# Patient Record
Sex: FEMALE | Race: WHITE | NOT HISPANIC OR LATINO | Employment: FULL TIME
[De-identification: names, ages, dates, MRNs, and addresses within clinical notes are randomized per-mention and may not be internally consistent; named-entity substitution may affect disease eponyms.]

---

## 2017-06-23 ENCOUNTER — RECORDS - HEALTHEAST (OUTPATIENT)
Dept: ADMINISTRATIVE | Facility: OTHER | Age: 52
End: 2017-06-23

## 2017-08-10 ENCOUNTER — RECORDS - HEALTHEAST (OUTPATIENT)
Dept: ADMINISTRATIVE | Facility: OTHER | Age: 52
End: 2017-08-10

## 2017-10-03 ASSESSMENT — MIFFLIN-ST. JEOR: SCORE: 1490.13

## 2017-10-05 ENCOUNTER — SURGERY - HEALTHEAST (OUTPATIENT)
Dept: SURGERY | Facility: CLINIC | Age: 52
End: 2017-10-05

## 2017-10-05 ENCOUNTER — HOME CARE/HOSPICE - HEALTHEAST (OUTPATIENT)
Dept: HOME HEALTH SERVICES | Facility: HOME HEALTH | Age: 52
End: 2017-10-05

## 2017-10-05 ENCOUNTER — ANESTHESIA - HEALTHEAST (OUTPATIENT)
Dept: SURGERY | Facility: CLINIC | Age: 52
End: 2017-10-05

## 2017-10-05 ASSESSMENT — MIFFLIN-ST. JEOR: SCORE: 1456.38

## 2020-11-09 ENCOUNTER — TRANSFERRED RECORDS (OUTPATIENT)
Dept: HEALTH INFORMATION MANAGEMENT | Facility: CLINIC | Age: 55
End: 2020-11-09

## 2021-02-23 ENCOUNTER — TRANSFERRED RECORDS (OUTPATIENT)
Dept: HEALTH INFORMATION MANAGEMENT | Facility: CLINIC | Age: 56
End: 2021-02-23

## 2021-02-24 ENCOUNTER — TRANSCRIBE ORDERS (OUTPATIENT)
Dept: OTHER | Age: 56
End: 2021-02-24

## 2021-02-24 DIAGNOSIS — R09.81 NASAL CONGESTION: Primary | ICD-10-CM

## 2021-02-25 ENCOUNTER — DOCUMENTATION ONLY (OUTPATIENT)
Dept: CARE COORDINATION | Facility: CLINIC | Age: 56
End: 2021-02-25

## 2021-02-25 NOTE — TELEPHONE ENCOUNTER
FUTURE VISIT INFORMATION      FUTURE VISIT INFORMATION:    Date: 3/12/21    Time: 10 AM    [place] [facility]  REFERRAL INFORMATION:    Referring provider:  Dr. Tanner Davis    Referring providers clinic:  [facility]    Reason for visit/diagnosis: Nasal Congestion    RECORDS REQUESTED FROM:       Clinic name Comments Records Status Imaging Status   [facility] 2/23/21 - ENT OV with Dr. Davis Scanned In    [facility] 2/22/21 - [facility] OV with DARRON Nayak  5/29/20 - UC OV with Dr. House  4/28/20 - ENT OV with Dr. Duke  4/3/20 - ENT OV with Dr. Colunga Care Everywhere    [facility] 11/13/20 - ED OV with Dr. Encinas  11/8/20 - ED OV with Dr. Shoemaker Care Everywhere    [facility] - Imaging 7/15/20 - CT Sinus  4/25/19 - XR Sinus  4/26/18 - US Head Neck  4/6/18 - CT Sinus WO Care Everywhere 2/25 Req - In PACs   [facility] 5/11/20 - ED OV with Dr. Hicks Care Everywhere                  * 2/25/21 11:18 AM Faxed req to [facility] for images to be pushed to [facility]s. - Latonya

## 2021-03-01 ENCOUNTER — TRANSFERRED RECORDS (OUTPATIENT)
Dept: HEALTH INFORMATION MANAGEMENT | Facility: CLINIC | Age: 56
End: 2021-03-01

## 2021-03-12 ENCOUNTER — OFFICE VISIT (OUTPATIENT)
Dept: OTOLARYNGOLOGY | Facility: CLINIC | Age: 56
End: 2021-03-12
Attending: OTOLARYNGOLOGY
Payer: COMMERCIAL

## 2021-03-12 ENCOUNTER — PRE VISIT (OUTPATIENT)
Dept: OTOLARYNGOLOGY | Facility: CLINIC | Age: 56
End: 2021-03-12

## 2021-03-12 VITALS — HEIGHT: 64 IN | BODY MASS INDEX: 32.78 KG/M2 | WEIGHT: 192 LBS

## 2021-03-12 DIAGNOSIS — R09.82 POST-NASAL DRIP: ICD-10-CM

## 2021-03-12 DIAGNOSIS — K21.9 LARYNGOPHARYNGEAL REFLUX: ICD-10-CM

## 2021-03-12 DIAGNOSIS — R09.81 NASAL CONGESTION: ICD-10-CM

## 2021-03-12 DIAGNOSIS — J34.3 HYPERTROPHY OF INFERIOR NASAL TURBINATE: Primary | ICD-10-CM

## 2021-03-12 PROCEDURE — 99243 OFF/OP CNSLTJ NEW/EST LOW 30: CPT | Mod: 25 | Performed by: OTOLARYNGOLOGY

## 2021-03-12 PROCEDURE — 31231 NASAL ENDOSCOPY DX: CPT | Performed by: OTOLARYNGOLOGY

## 2021-03-12 RX ORDER — ALBUTEROL SULFATE 90 UG/1
AEROSOL, METERED RESPIRATORY (INHALATION)
COMMUNITY
Start: 2020-11-28

## 2021-03-12 RX ORDER — FLUTICASONE PROPIONATE 50 MCG
2 SPRAY, SUSPENSION (ML) NASAL DAILY
Qty: 9.9 ML | Refills: 6 | Status: SHIPPED | OUTPATIENT
Start: 2021-03-12 | End: 2021-04-11

## 2021-03-12 RX ORDER — MAGNESIUM 200 MG
250 TABLET ORAL
COMMUNITY
Start: 2020-02-05

## 2021-03-12 RX ORDER — HYDROXYZINE HYDROCHLORIDE 25 MG/1
25 TABLET, FILM COATED ORAL
COMMUNITY
Start: 2021-02-15

## 2021-03-12 RX ORDER — ECHINACEA PURPUREA EXTRACT 125 MG
1 TABLET ORAL
COMMUNITY

## 2021-03-12 RX ORDER — B-COMPLEX WITH VITAMIN C
TABLET ORAL
COMMUNITY

## 2021-03-12 ASSESSMENT — MIFFLIN-ST. JEOR: SCORE: 1452.97

## 2021-03-12 ASSESSMENT — PAIN SCALES - GENERAL: PAINLEVEL: NO PAIN (0)

## 2021-03-12 NOTE — NURSING NOTE
"Chief Complaint   Patient presents with     Consult     Nasal congestion         Height 1.613 m (5' 3.5\"), weight 87.1 kg (192 lb).    Alice Roldan, EMT    "

## 2021-03-12 NOTE — PATIENT INSTRUCTIONS
1. You were seen in the ENT Clinic today by Dr. Triplett.      2.  Flonase two sprays twice a day.    3.  Continue Diet modification.    4.  If you would like to start a acid blocker please call.    5.   Plan to return to clinic as needed.        Please call our clinic for any questions, concerns, and/or worsening symptoms.      Clinic #[phone]       Option 1 for scheduling.     Thank you for allowing us to be a part of your care!     Luzmaria GAN LPN  [facility] ENT     If you need to reach me my direct line is: [phone].

## 2021-03-12 NOTE — PROGRESS NOTES
[facility]                   New Patient Visit      Encounter date: March 12, 2021    Referring Provider:   Tanner Davis  [facility]  [address]  [place] [address]    Chief Complaint: nasal congestion, dryness, post-nasal drip    History of Present Illness: Irina Fontana is a pleasant 55-year-old woman who I am seeing at the request of Tanner Davis for approximately 1 year of symptoms consisting of facial pain and pressure, postnasal drip, nasal congestion, throat fullness as well as difficulty with sleeping. She is on a number of different specialists at [facility] and [facility].  She was diagnosed with oropharyngeal and laryngeal thrush.  She was treated with mycelex douches and Diflucan.  She had allergy testing and was reactive to molds and tree pollens.  She was using Aquaphor intranasally with improvement in her nasal dryness.  She complains today of persistent postnasal drip and throat fullness that is improved with cutting out sugar entirely from her diet.  She is also lost approximately 10 to 15 pounds.  She does also admit to underlying anxiety, which causes her to pay special attention to her symptoms decreases her productivity.  She had a number of CT scans which we have reviewed and they do not demonstrate any evidence of sinus disease.    Sino-Nasal Outcome Test (SNOT - 22)  DN    Review of systems: A 14-point review of systems has been conducted and was negative for any notable symptoms, except as dictated in the history of present illness.     Past Medical History:   Diagnosis Date     Anxiety         Past Surgical History:   Procedure Laterality Date     ORTHOPEDIC SURGERY      bilateral hip replacements        No family history on file.     Social History     Socioeconomic History     Marital status: Single     Spouse name: Not on file     Number of children: Not on file     Years of education: Not on file     Highest  education level: Not on file   Occupational History     Not on file   Social Needs     Financial resource strain: Not on file     Food insecurity     Worry: Not on file     Inability: Not on file     Transportation needs     Medical: Not on file     Non-medical: Not on file   Tobacco Use     Smoking status: Former Smoker   Substance and Sexual Activity     Alcohol use: Yes     Comment: rarely     Drug use: No     Sexual activity: Not on file   Lifestyle     Physical activity     Days per week: Not on file     Minutes per session: Not on file     Stress: Not on file   Relationships     Social connections     Talks on phone: Not on file     Gets together: Not on file     Attends Confucianist service: Not on file     Active member of club or organization: Not on file     Attends meetings of clubs or organizations: Not on file     Relationship status: Not on file     Intimate partner violence     Fear of current or ex partner: Not on file     Emotionally abused: Not on file     Physically abused: Not on file     Forced sexual activity: Not on file   Other Topics Concern     Not on file   Social History Narrative     Not on file        Physical Exam:  Vital signs: There were no vitals taken for this visit.   General Appearance: No acute distress, appropriate demeanor, conversant  Eyes: moist conjunctivae; EOMI; pupils symmetric; visual acuity grossly intact; no proptosis  Head: normocephalic; overall symmetric appearance without deformity  Face: overall symmetric without deformity; HB I/VI  Ears: Normal appearance of external ear; external meatus normal in appearance; TMs intact without perforation bilaterally;   Nose: No external deformity; septum  relatively midline; inferior turbinates without significant hypertrophy  Oral Cavity/oropharynx: Normal appearance of mucosa; tongue midline; no mass or lesions; oropharynx without obvious mucosal abnormality  Neck: no palpable lymphadenopathy; thyroid without palpable  nodules  Lungs: symmetric chest rise; no wheezing  CV: Good distal perfusion; normal heart rate  Extremities: No deformity  Neurologic Exam: Cranial nerves II-XII are grossly intact; no focal deficit      Procedure Note  Procedure performed: Rigid nasal endoscopy  Indication: To evaluate for sinonasal pathology not visualized on routine anterior rhinoscopy  Anesthesia: 4% topical lidocaine with 0.05% oxymetazoline  Description of procedure: A 30 degree, 3 mm rigid endoscope was inserted into bilateral nasal cavities and the nasal valve, nasal cavity, middle meatus, sphenoethmoid recess, and nasopharynx were thoroughly evaluated for evidence of obstruction, edema, purulence, polyps and/or mass/lesion.     Loren-Eddie Endoscopic Scoring System  Endoscopic observation Right Left   Polyps in middle meatus (0 = absent, 1 = restricted to middle meatus, 2 = Beyond middle meatus) 0 0   Discharge (0 = absent, 1 = thin and clear, 2 = thick, purulent) 0 0   Edema (0 = absent, 1 = mild-moderate, 2 = moderate-severe) 0 0   Crusting (0 = absent, 1 = mild-moderate, 2 = moderate-severe) 0 0   Scarring (0= absent, 1 = mild-moderate, 2 = moderate-severe) 0 0   Total 0 0     Findings  RT: MM and SER clear  LT: MM and SER clear    Nasopharynx clear     The patient tolerated the procedure well without complication.     Laboratory Review:  n/a    Imaging Review:  I reviewed outside CT sinus from July 14th 2020: The paranasal sinuses are clear there is a mild leftward septal deviation there is bony hypertrophy of bilateral inferior turbinates.    Pathology Review:  n/a    Assessment/Medical Decision Making/Plan:  Nasal congestion  Inferior turbinate hypertrophy  Postnasal drip  Likely laryngeal pharyngeal reflux  Anxiety  Morbid obesity    I performed bilateral nasal endoscopy today.  Her exam is clear.  I performed a thorough history and physical and reviewed outside imaging and notes.  Irina's symptoms seem to me to largely be the  result of some mild allergies, turbinate hypertrophy as well as underlying laryngal pharyngeal reflux.  I think laryngal pharyngeal reflux is a major component in her symptoms, she has had significant improvement with diet modifications.  We discussed a trial of twice daily PPI, but she deferred at this time.  For her nasal congestion, I did recommend use of Flonase.  In my experience, this does not commonly cause thrush, which she was worried about.  She can use this up to 2 sprays twice daily.  I did encourage her to continue to seek out counseling, as her symptoms do seem to cause significant amount of distress in her life.  She is very insightful regarding this.  She will follow-up with me as needed.        Frankie Triplett MD    [facility]  Rhinology  Endoscopic Skull Base Surgery  [facility]  Department of Otolaryngology - Head & Neck Surgery

## 2021-05-30 ENCOUNTER — HEALTH MAINTENANCE LETTER (OUTPATIENT)
Age: 56
End: 2021-05-30

## 2021-05-31 VITALS — BODY MASS INDEX: 34.46 KG/M2 | HEIGHT: 63 IN | WEIGHT: 194.5 LBS

## 2021-05-31 VITALS — BODY MASS INDEX: 40.39 KG/M2 | HEIGHT: 63 IN | HEIGHT: 63 IN | BODY MASS INDEX: 40.39 KG/M2

## 2021-05-31 VITALS — BODY MASS INDEX: 37.38 KG/M2 | WEIGHT: 211 LBS | BODY MASS INDEX: 37.38 KG/M2 | WEIGHT: 211 LBS

## 2021-06-13 NOTE — ANESTHESIA PROCEDURE NOTES
Spinal Block    Patient location during procedure: OR  Start time: 10/5/2017 10:35 AM  End time: 10/5/2017 10:37 AM  Reason for block: at surgeon's request and primary anesthetic    Staffing:  Performing  Anesthesiologist: REY EARL    Preanesthetic Checklist  Completed: patient identified, risks, benefits, and alternatives discussed, timeout performed, consent obtained, at patient's request, airway assessed, oxygen available, suction available, emergency drugs available and hand hygiene performed  Spinal Block  Patient position: sitting  Prep: ChloraPrep  Patient monitoring: heart rate, cardiac monitor, continuous pulse ox and blood pressure  Approach: midline  Location: L3-4  Injection technique: single-shot  Needle type: pencil-tip   Needle gauge: 24 G      Additional Notes:  Good sab.  No complications.

## 2021-06-13 NOTE — ANESTHESIA PREPROCEDURE EVALUATION
Anesthesia Evaluation      Patient summary reviewed     Airway   Mallampati: II  Neck ROM: full   Pulmonary - normal exam   (+) sleep apnea on no CPAP, ,                          Cardiovascular - normal exam  (+) hypertension, ,     Rhythm: regular  Rate: normal,         Neuro/Psych - negative ROS     Endo/Other    (+) obesity,      GI/Hepatic/Renal - negative ROS           Dental - normal exam                        Anesthesia Plan  Planned anesthetic: spinal    ASA 2   Induction: intravenous   Anesthetic plan and risks discussed with: patient    Post-op plan: routine recovery

## 2021-06-13 NOTE — ANESTHESIA POSTPROCEDURE EVALUATION
Patient: Irina Fontana  REVISION, TOTAL HIP ARTHROPLASTY, RIGHT   Anesthesia type: spinal    Patient location: floor  Last vitals:   Vitals:    10/05/17 1604   BP: 129/68   Pulse: 64   Resp: 14   Temp:    SpO2: 100%     Post vital signs: stable  Level of consciousness: awake and responds to simple questions  Post-anesthesia pain: pain controlled  Post-anesthesia nausea and vomiting: no  Pulmonary: unassisted, return to baseline  Cardiovascular: stable and blood pressure at baseline  Hydration: adequate  Anesthetic events: no    QCDR Measures:  ASA# 11 - Nahed-op Cardiac Arrest: ASA11B - Patient did NOT experience unanticipated cardiac arrest  ASA# 12 - Nahed-op Mortality Rate: ASA12B - Patient did NOT die  ASA# 13 - PACU Re-Intubation Rate: NA - No ETT / LMA used for case  ASA# 10 - Composite Anes Safety: ASA10A - No serious adverse event    Additional Notes:

## 2021-06-13 NOTE — ANESTHESIA CARE TRANSFER NOTE
Last vitals:   Vitals:    10/05/17 1204   BP: 126/73   Pulse: 88   Resp: 12   Temp: 36.6  C (97.8  F)   SpO2: 100%     Patient's level of consciousness is drowsy  Spontaneous respirations: yes  Maintains airway independently: yes  Dentition unchanged: yes  Oropharynx: oropharynx clear of all foreign objects    QCDR Measures:  ASA# 20 - Surgical Safety Checklist: WHO surgical safety checklist completed prior to induction  PQRS# 430 - Adult PONV Prevention: NA - Not adult patient, not GA or 3 or more risk factors NOT present  ASA# 8 - Peds PONV Prevention: NA - Not pediatric patient, not GA or 2 or more risk factors NOT present  PQRS# 424 - Nahed-op Temp Management: 4559F - At least one body temp DOCUMENTED => 35.5C or 95.9F within required timeframe  PQRS# 426 - PACU Transfer Protocol: - Transfer of care checklist used  ASA# 14 - Acute Post-op Pain: ASA14B - Patient did NOT experience pain >= 7 out of 10

## 2021-06-16 PROBLEM — Z96.649 FAILED TOTAL HIP ARTHROPLASTY, INITIAL ENCOUNTER (H): Status: ACTIVE | Noted: 2017-10-05

## 2021-06-16 PROBLEM — T84.018A FAILED TOTAL HIP ARTHROPLASTY, INITIAL ENCOUNTER (H): Status: ACTIVE | Noted: 2017-10-05

## 2021-09-19 ENCOUNTER — HEALTH MAINTENANCE LETTER (OUTPATIENT)
Age: 56
End: 2021-09-19

## 2022-06-26 ENCOUNTER — HEALTH MAINTENANCE LETTER (OUTPATIENT)
Age: 57
End: 2022-06-26

## 2022-11-21 ENCOUNTER — HEALTH MAINTENANCE LETTER (OUTPATIENT)
Age: 57
End: 2022-11-21

## 2023-02-07 ENCOUNTER — TRANSFERRED RECORDS (OUTPATIENT)
Dept: HEALTH INFORMATION MANAGEMENT | Facility: CLINIC | Age: 58
End: 2023-02-07

## 2023-04-19 NOTE — TELEPHONE ENCOUNTER
FUTURE VISIT INFORMATION:      FUTURE VISIT INFORMATION:    Date: 6/23/23    Time: 11 am    Location: [facility]  REFERRAL INFORMATION:    Referring provider:     Referring providers clinic:     Reason for visit/diagnosis:  New per pt, difficulty swallowing, recs with [facility] in [place] and [place] - pt. requesting transfer, self referred, confirmed [facility] location    RECORDS REQUESTED FROM:       Clinic name Comments Records Status Imaging Status   [facility]  Medical records  [address] [place], [place] [address] Fax: [phone] Pending records - requested 5/2/23 - received   1/10/23, 2/7/23 OVs    *need to req 1/10/23  CT from [facility] - req 5/19  Tracking #: [id number]   Pending  Mail RACHANA 4/20 - sent to scan 5/9/23    [facility]   9/28/22 OV with Alexsandra Duke MD CE    Imaging  [phone]   XR chest 6/8/22  XR chest 11/8/20 CE Pacs   Department of Otorhinolaryngology in [place], [place] 7/2/21 OV with Gianna Alcaraz P.A.-C.   CE    Procedure EGD 3/27/23  CE    [facility] PFT 3/1/21 Scanned in Epic       April 19, 2023 at 4:23 PM - Called the patient to discuss outside records. Patient said [facility] refer her to see a Laryngologist and that she recently had a EGD. Patient shared that is she coughing up thick and white stuff. Patient also shared that her worst issue is her sinus which maybe the cause of everything. When breathing from the nose it causes swallowing and breathing issue. PCP is treating with anti-fungal infection and felt that she has been on a lot of antibiotic in the last 3 years. Have not been feeling well and it is difficut to breath by the mouth kind of improve but not normal after covid. Patient okay to signed a RACHANA and prefer mailing it out. PLAN: Will fedex RACHANA to the patient. Address verifyYaz Puckett  April 20, 2023 at 6:59 AM - Mailed out RACHANA to the patient to obtain recs at [facility] via Fedex Track  [id number]. -Sravanthi  April 27, 2023 at 2:15 PM - Follow up with the patient re: RACHANA to obtain recs with [facility] send via [facility]. Patient states she received it yesterday and will tried to get it to me. Patient okay to follow up next week on status -Sravanthi  May 2, 2023 at 5:08 PM - Received signed RACHANA, send to scan, and send req for recs @ [facility]  5/5/23 12:42PM received recs  In the mail, sent to scan - Amay   May 19, 2023 at 1:29 PM - Called [facility] film room for XR images. Images resolved in Pacs. Request faxed to [facility] for in-house CT sinus -Sravanthi  May 26, 2023 at 7:10 AM - Called [facility] but they are currently close. Will called again this afternoon -Sravanthi  * UPDATE 11:05 AM - Called [facility] to follow up on fax request. No answered. No live person. Machine request to [facility] and they call call back. LM with CB number -Sravanthi  June 1, 2023 at 12:38 PM - Received called from Latonya @ [facility] that facility mailed out the disc 2x and it was mailed to [facility] Sravanthi with ENT mail code also included. Called [facility] MR back and LM that I have not received the disc and left CB number. Tracking #: [id number] send with request -Sravanthi

## 2023-06-06 NOTE — TELEPHONE ENCOUNTER
Imaging Received  June 6, 2023 10:16 AM Nguyen   Action: Imaging disc from [facility] received and taken to emily for upload.

## 2023-06-22 ENCOUNTER — PATIENT OUTREACH (OUTPATIENT)
Dept: OTOLARYNGOLOGY | Facility: CLINIC | Age: 58
End: 2023-06-22
Payer: COMMERCIAL

## 2023-06-22 NOTE — PROGRESS NOTES
Called patient and left voicemail to move patient's appointment time from 11:00 to 1:00 due to a scheduling conflict. Provided direct call back number. Parvin Nayak RN on 6/22/2023 at 8:07 AM

## 2023-06-23 ENCOUNTER — PRE VISIT (OUTPATIENT)
Dept: OTOLARYNGOLOGY | Facility: CLINIC | Age: 58
End: 2023-06-23

## 2023-07-09 ENCOUNTER — HEALTH MAINTENANCE LETTER (OUTPATIENT)
Age: 58
End: 2023-07-09

## 2023-08-02 NOTE — TELEPHONE ENCOUNTER
FUTURE VISIT INFORMATION      FUTURE VISIT INFORMATION:  Date: 10/26/23  Time: 1pm  Location: [facility]  REFERRAL INFORMATION:  Referring provider:    Referring providers clinic:    Reason for visit/diagnosis  scope, fees, voice and swallow slp, New per pt, difficulty swallowing, recs with [facility] in [place] - pt. requesting transfer, self referred, confirmed [facility] location     RECORDS REQUESTED FROM:         Clinic name Comments Records Status Imaging Status   [facility]  Medical records  [address] [place], [place] [address] Fax: [phone] 1/10/23, 2/7/23 OVs     1/10/23  - CT     Pending  Mail RACHANA 4/20 - sent to scan 5/9/23  pacs   [facility]   9/28/22 OV with Alexsandra Duke MD CE     Imaging  [phone]   XR chest 6/8/22  XR chest 11/8/20 CE Pacs   Department of Otorhinolaryngology in [place], [place] 7/2/21 OV with Gianna Alcaraz P.A.-C.   CE     Procedure EGD 3/27/23  CE     [facility] PFT 3/1/21 Scanned in [facility]

## 2023-10-26 ENCOUNTER — PRE VISIT (OUTPATIENT)
Dept: OTOLARYNGOLOGY | Facility: CLINIC | Age: 58
End: 2023-10-26

## 2024-09-01 ENCOUNTER — HEALTH MAINTENANCE LETTER (OUTPATIENT)
Age: 59
End: 2024-09-01

## 2025-05-31 ENCOUNTER — HEALTH MAINTENANCE LETTER (OUTPATIENT)
Age: 60
End: 2025-05-31